# Patient Record
Sex: MALE | Race: WHITE | Employment: FULL TIME | URBAN - METROPOLITAN AREA
[De-identification: names, ages, dates, MRNs, and addresses within clinical notes are randomized per-mention and may not be internally consistent; named-entity substitution may affect disease eponyms.]

---

## 2017-03-22 PROBLEM — T84.89XS: Status: ACTIVE | Noted: 2017-03-22

## 2017-10-25 ENCOUNTER — TELEPHONE (OUTPATIENT)
Dept: FAMILY MEDICINE CLINIC | Facility: CLINIC | Age: 32
End: 2017-10-25

## 2017-10-25 NOTE — TELEPHONE ENCOUNTER
He has not been seen since 2012. He is not considered an active patient in the practice and thus we are unable to give out medical advice.  Please have him make an appt in the near future, thanks

## 2017-11-02 ENCOUNTER — MED REC SCAN ONLY (OUTPATIENT)
Dept: FAMILY MEDICINE CLINIC | Facility: CLINIC | Age: 32
End: 2017-11-02

## 2017-12-08 ENCOUNTER — MED REC SCAN ONLY (OUTPATIENT)
Dept: FAMILY MEDICINE CLINIC | Facility: CLINIC | Age: 32
End: 2017-12-08

## 2018-01-10 ENCOUNTER — TELEPHONE (OUTPATIENT)
Dept: FAMILY MEDICINE CLINIC | Facility: CLINIC | Age: 33
End: 2018-01-10

## 2018-01-10 NOTE — TELEPHONE ENCOUNTER
Pt would like to know if [facility] would take him back as a pt. Last seen 12/2012    Pt has an infection on his finger. Wants to have it looked at.    [employer]    Please return call to [phone]

## 2018-01-17 ENCOUNTER — OFFICE VISIT (OUTPATIENT)
Dept: FAMILY MEDICINE CLINIC | Facility: CLINIC | Age: 33
End: 2018-01-17

## 2018-01-17 VITALS
TEMPERATURE: 98 F | BODY MASS INDEX: 26.61 KG/M2 | HEART RATE: 72 BPM | RESPIRATION RATE: 16 BRPM | WEIGHT: 214 LBS | SYSTOLIC BLOOD PRESSURE: 120 MMHG | HEIGHT: 75 IN | DIASTOLIC BLOOD PRESSURE: 70 MMHG

## 2018-01-17 DIAGNOSIS — L03.012 PARONYCHIA OF LEFT THUMB: Primary | ICD-10-CM

## 2018-01-17 PROBLEM — T84.89XS: Status: RESOLVED | Noted: 2017-03-22 | Resolved: 2018-01-17

## 2018-01-17 PROCEDURE — 99203 OFFICE O/P NEW LOW 30 MIN: CPT | Performed by: FAMILY MEDICINE

## 2018-01-17 RX ORDER — TRIAMCINOLONE ACETONIDE 5 MG/G
OINTMENT TOPICAL
Qty: 1 TUBE | Refills: 0 | Status: SHIPPED | OUTPATIENT
Start: 2018-01-17

## 2018-01-17 NOTE — PROGRESS NOTES
HPI:    Patient ID: Andrew Avendaño is a 28year old male. Patient presents with:  Finger Pain: thumb pain      HPI   Patient is here to establish care. He is here for left thumb pain and swelling surrounding thumb nail for 2 weeks.  States he has Eyrthematous skin with mild edema surrounding Left thumb nail. Yellowish discharge. Tender to palpation              ASSESSMENT/PLAN:     Cynthia was seen today for finger pain.     Diagnoses and all orders for this visit:    Paronychia of left thumb

## 2018-08-08 ENCOUNTER — MED REC SCAN ONLY (OUTPATIENT)
Dept: FAMILY MEDICINE CLINIC | Facility: CLINIC | Age: 33
End: 2018-08-08

## 2018-09-01 ENCOUNTER — HOSPITAL ENCOUNTER (OUTPATIENT)
Age: 33
Discharge: HOME | End: 2018-09-01
Attending: FAMILY MEDICINE
Payer: COMMERCIAL

## 2018-09-01 VITALS
DIASTOLIC BLOOD PRESSURE: 69 MMHG | WEIGHT: 200 LBS | OXYGEN SATURATION: 96 % | HEIGHT: 74 IN | HEART RATE: 94 BPM | SYSTOLIC BLOOD PRESSURE: 108 MMHG | RESPIRATION RATE: 16 BRPM | BODY MASS INDEX: 25.67 KG/M2 | TEMPERATURE: 100 F

## 2018-09-01 DIAGNOSIS — J02.0 STREP PHARYNGITIS: Primary | ICD-10-CM

## 2018-09-01 LAB — POCT RAPID STREP: POSITIVE

## 2018-09-01 PROCEDURE — 99204 OFFICE O/P NEW MOD 45 MIN: CPT

## 2018-09-01 PROCEDURE — 99213 OFFICE O/P EST LOW 20 MIN: CPT

## 2018-09-01 PROCEDURE — 87430 STREP A AG IA: CPT | Performed by: FAMILY MEDICINE

## 2018-09-01 RX ORDER — AMOXICILLIN 875 MG/1
875 TABLET, COATED ORAL EVERY 12 HOURS
Qty: 20 TABLET | Refills: 0 | Status: SHIPPED | OUTPATIENT
Start: 2018-09-01 | End: 2018-09-11

## 2018-09-01 NOTE — ED PROVIDER NOTES
Patient Seen in: [address] [facility]    History   Patient presents with:  Sore Throat    Stated Complaint: SORE THROAT/FEVER    HPI    28-year-old male presents with sore throat, fever and headache since yesterday.   States he has a history of light.   Neck: Normal range of motion. Neck supple. Cardiovascular: Normal rate, regular rhythm, normal heart sounds and intact distal pulses.     Pulmonary/Chest: Effort normal and breath sounds normal.   Neurological: He is alert and oriented to person,

## 2018-09-06 ENCOUNTER — HOSPITAL ENCOUNTER (OUTPATIENT)
Age: 33
Discharge: HOME | End: 2018-09-06
Attending: EMERGENCY MEDICINE
Payer: COMMERCIAL

## 2018-09-06 VITALS
HEART RATE: 72 BPM | TEMPERATURE: 98 F | OXYGEN SATURATION: 99 % | SYSTOLIC BLOOD PRESSURE: 120 MMHG | RESPIRATION RATE: 20 BRPM | DIASTOLIC BLOOD PRESSURE: 60 MMHG

## 2018-09-06 DIAGNOSIS — H92.01 RIGHT EAR PAIN: ICD-10-CM

## 2018-09-06 DIAGNOSIS — J02.9 SORE THROAT: Primary | ICD-10-CM

## 2018-09-06 PROCEDURE — 99213 OFFICE O/P EST LOW 20 MIN: CPT

## 2018-09-06 PROCEDURE — 99212 OFFICE O/P EST SF 10 MIN: CPT

## 2018-09-06 NOTE — ED INITIAL ASSESSMENT (HPI)
Sore throat for 7 days, tested positive here for strep, throat feels like \"glass when he talks and swallows. \" here to be rechecked. Now is complaining of right ear pain.

## 2018-09-06 NOTE — ED PROVIDER NOTES
Patient presents with:  Sore Throat    HPI:     Annie Ferrera is a 35year old male who presents with chief complaint of sore throat. Started 6 days ago. Was seen here on day 2 of illness, dx with strep with positive rapid strep.   His 9year old OTC medications. Suspect viral etiology with possible strep carrier vs strep with viral exposure from his son. No signs of complications such as PTA nor deep anterior neck infection.   Will finish amoxil as he is improving and follow up if symptoms persis

## 2019-02-02 ENCOUNTER — HOSPITAL ENCOUNTER (OUTPATIENT)
Age: 34
Discharge: HOME | End: 2019-02-02
Payer: COMMERCIAL

## 2019-02-02 VITALS
TEMPERATURE: 100 F | OXYGEN SATURATION: 98 % | WEIGHT: 205 LBS | BODY MASS INDEX: 26.31 KG/M2 | DIASTOLIC BLOOD PRESSURE: 66 MMHG | SYSTOLIC BLOOD PRESSURE: 113 MMHG | HEIGHT: 74 IN | RESPIRATION RATE: 16 BRPM | HEART RATE: 103 BPM

## 2019-02-02 DIAGNOSIS — J06.9 VIRAL UPPER RESPIRATORY ILLNESS: Primary | ICD-10-CM

## 2019-02-02 DIAGNOSIS — J02.9 VIRAL PHARYNGITIS: ICD-10-CM

## 2019-02-02 LAB — POCT RAPID STREP: NEGATIVE

## 2019-02-02 PROCEDURE — 87081 CULTURE SCREEN ONLY: CPT | Performed by: NURSE PRACTITIONER

## 2019-02-02 PROCEDURE — 87147 CULTURE TYPE IMMUNOLOGIC: CPT | Performed by: NURSE PRACTITIONER

## 2019-02-02 PROCEDURE — 99213 OFFICE O/P EST LOW 20 MIN: CPT

## 2019-02-02 PROCEDURE — 87430 STREP A AG IA: CPT | Performed by: NURSE PRACTITIONER

## 2019-02-02 PROCEDURE — 99214 OFFICE O/P EST MOD 30 MIN: CPT

## 2019-02-02 NOTE — ED PROVIDER NOTES
Patient Seen in: [address] [facility]    History   Patient presents with:  Sore Throat    Stated Complaint: sore throat    51-year-old male presents today with complaints of congestion runny nose sore throat and intermittent cough.   States fam Head: Normocephalic. Right Ear: Tympanic membrane and ear canal normal.   Left Ear: Tympanic membrane and ear canal normal.   Nose: Mucosal edema and rhinorrhea present.    Mouth/Throat: Uvula is midline and mucous membranes are normal. Posterior oropha

## 2020-01-10 ENCOUNTER — MED REC SCAN ONLY (OUTPATIENT)
Dept: FAMILY MEDICINE CLINIC | Facility: CLINIC | Age: 35
End: 2020-01-10

## 2020-02-15 ENCOUNTER — MED REC SCAN ONLY (OUTPATIENT)
Dept: FAMILY MEDICINE CLINIC | Facility: CLINIC | Age: 35
End: 2020-02-15

## 2020-10-07 ENCOUNTER — MED REC SCAN ONLY (OUTPATIENT)
Dept: FAMILY MEDICINE CLINIC | Facility: CLINIC | Age: 35
End: 2020-10-07

## 2020-12-08 ENCOUNTER — MED REC SCAN ONLY (OUTPATIENT)
Dept: FAMILY MEDICINE CLINIC | Facility: CLINIC | Age: 35
End: 2020-12-08

## 2021-06-08 ENCOUNTER — MED REC SCAN ONLY (OUTPATIENT)
Dept: FAMILY MEDICINE CLINIC | Facility: CLINIC | Age: 36
End: 2021-06-08

## 2022-10-18 ENCOUNTER — HOSPITAL ENCOUNTER (OUTPATIENT)
Age: 37
Discharge: HOME | End: 2022-10-18
Payer: COMMERCIAL

## 2022-10-18 VITALS
HEIGHT: 75 IN | DIASTOLIC BLOOD PRESSURE: 71 MMHG | TEMPERATURE: 98 F | WEIGHT: 215 LBS | BODY MASS INDEX: 26.73 KG/M2 | HEART RATE: 71 BPM | SYSTOLIC BLOOD PRESSURE: 136 MMHG | OXYGEN SATURATION: 98 % | RESPIRATION RATE: 16 BRPM

## 2022-10-18 DIAGNOSIS — J02.0 STREPTOCOCCAL SORE THROAT: Primary | ICD-10-CM

## 2022-10-18 LAB — S PYO AG THROAT QL: POSITIVE

## 2022-10-18 PROCEDURE — 87880 STREP A ASSAY W/OPTIC: CPT | Performed by: NURSE PRACTITIONER

## 2022-10-18 PROCEDURE — 99203 OFFICE O/P NEW LOW 30 MIN: CPT | Performed by: NURSE PRACTITIONER

## 2022-10-18 RX ORDER — AMOXICILLIN 875 MG/1
875 TABLET, COATED ORAL 2 TIMES DAILY
Qty: 20 TABLET | Refills: 0 | Status: SHIPPED | OUTPATIENT
Start: 2022-10-18 | End: 2022-10-28

## 2022-10-21 ENCOUNTER — HOSPITAL ENCOUNTER (OUTPATIENT)
Age: 37
Discharge: HOME | End: 2022-10-21
Payer: COMMERCIAL

## 2022-10-21 VITALS
RESPIRATION RATE: 16 BRPM | HEART RATE: 77 BPM | TEMPERATURE: 98 F | DIASTOLIC BLOOD PRESSURE: 74 MMHG | SYSTOLIC BLOOD PRESSURE: 124 MMHG

## 2022-10-21 DIAGNOSIS — J02.9 SORE THROAT: Primary | ICD-10-CM

## 2022-10-21 LAB — S PYO AG THROAT QL: NEGATIVE

## 2022-10-21 PROCEDURE — 99203 OFFICE O/P NEW LOW 30 MIN: CPT | Performed by: PHYSICIAN ASSISTANT

## 2022-10-21 PROCEDURE — 87880 STREP A ASSAY W/OPTIC: CPT | Performed by: PHYSICIAN ASSISTANT

## 2022-10-21 NOTE — ED INITIAL ASSESSMENT (HPI)
Patient states he was treated 10/18/22 in [facility] for + strep throat with Amox. C/O continues to have \"throat discomfort\".

## 2022-11-26 ENCOUNTER — MED REC SCAN ONLY (OUTPATIENT)
Dept: FAMILY MEDICINE CLINIC | Facility: CLINIC | Age: 37
End: 2022-11-26

## 2023-06-05 ENCOUNTER — HOSPITAL ENCOUNTER (OUTPATIENT)
Age: 38
Discharge: HOME | End: 2023-06-05
Payer: COMMERCIAL

## 2023-06-05 VITALS
HEIGHT: 74 IN | HEART RATE: 72 BPM | BODY MASS INDEX: 26.31 KG/M2 | OXYGEN SATURATION: 97 % | DIASTOLIC BLOOD PRESSURE: 65 MMHG | TEMPERATURE: 98 F | SYSTOLIC BLOOD PRESSURE: 125 MMHG | WEIGHT: 205 LBS | RESPIRATION RATE: 18 BRPM

## 2023-06-05 DIAGNOSIS — J02.9 PHARYNGITIS, UNSPECIFIED ETIOLOGY: Primary | ICD-10-CM

## 2023-06-05 LAB — S PYO AG THROAT QL: NEGATIVE

## 2023-06-05 PROCEDURE — 87880 STREP A ASSAY W/OPTIC: CPT | Performed by: NURSE PRACTITIONER

## 2023-06-05 PROCEDURE — 99213 OFFICE O/P EST LOW 20 MIN: CPT | Performed by: NURSE PRACTITIONER

## 2023-06-05 NOTE — DISCHARGE INSTRUCTIONS
Your rapid strep test is negative today    Acetaminophen/ Ibuprofen as needed and as directed on packaging for discomfort  Gargle with salt water solution as needed: 1 tsp of table salt in 8 oz of warm water  Over the counter throat lozenges and throat sprays may provide some relief  Seek immediate medical attention if your symptoms persist or worsen or if you are running fevers of having swelling of your airway, neck, or face.

## 2023-09-07 ENCOUNTER — TELEPHONE (OUTPATIENT)
Dept: FAMILY MEDICINE CLINIC | Facility: CLINIC | Age: 38
End: 2023-09-07

## 2023-09-07 NOTE — TELEPHONE ENCOUNTER
PATIENT FAMILY BELONGS TO DR YOUNG. WOULD DR YOUNG TAKE HIM ON.  HE SAYS HE NEEDS A REFERRAL FOR SOMETHING

## 2023-09-29 ENCOUNTER — HOSPITAL ENCOUNTER (OUTPATIENT)
Dept: MRI IMAGING | Age: 38
Discharge: HOME | End: 2023-09-29
Attending: FAMILY MEDICINE
Payer: COMMERCIAL

## 2023-09-29 ENCOUNTER — OFFICE VISIT (OUTPATIENT)
Dept: FAMILY MEDICINE CLINIC | Facility: CLINIC | Age: 38
End: 2023-09-29
Payer: COMMERCIAL

## 2023-09-29 VITALS
RESPIRATION RATE: 16 BRPM | TEMPERATURE: 98 F | OXYGEN SATURATION: 95 % | DIASTOLIC BLOOD PRESSURE: 70 MMHG | HEART RATE: 86 BPM | HEIGHT: 73.5 IN | SYSTOLIC BLOOD PRESSURE: 110 MMHG | WEIGHT: 211 LBS | BODY MASS INDEX: 27.37 KG/M2

## 2023-09-29 DIAGNOSIS — S83.207A POSITIVE MCMURRAY TEST OF LEFT KNEE, INITIAL ENCOUNTER: ICD-10-CM

## 2023-09-29 DIAGNOSIS — M25.562 LEFT KNEE PAIN, UNSPECIFIED CHRONICITY: ICD-10-CM

## 2023-09-29 DIAGNOSIS — M25.562 LEFT KNEE PAIN, UNSPECIFIED CHRONICITY: Primary | ICD-10-CM

## 2023-09-29 PROCEDURE — 73721 MRI JNT OF LWR EXTRE W/O DYE: CPT | Performed by: FAMILY MEDICINE

## 2023-10-19 ENCOUNTER — TELEPHONE (OUTPATIENT)
Dept: PHYSICAL THERAPY | Facility: HOSPITAL | Age: 38
End: 2023-10-19

## 2023-10-23 ENCOUNTER — OFFICE VISIT (OUTPATIENT)
Dept: PHYSICAL THERAPY | Age: 38
End: 2023-10-23
Attending: FAMILY MEDICINE
Payer: COMMERCIAL

## 2023-10-23 PROCEDURE — 97110 THERAPEUTIC EXERCISES: CPT

## 2023-10-23 PROCEDURE — 97161 PT EVAL LOW COMPLEX 20 MIN: CPT

## 2023-10-24 ENCOUNTER — OFFICE VISIT (OUTPATIENT)
Dept: PHYSICAL THERAPY | Age: 38
End: 2023-10-24
Attending: FAMILY MEDICINE

## 2023-10-24 PROCEDURE — 97140 MANUAL THERAPY 1/> REGIONS: CPT

## 2023-10-24 PROCEDURE — 97110 THERAPEUTIC EXERCISES: CPT

## 2023-10-24 NOTE — PROGRESS NOTES
Discharge Summary  Pt has attended 2 visits in Physical Therapy. Insurance (Authorized # of Visits):  [place] 2/5 visits expires 12/31/2023              Diagnosis:   Sprain of medial collateral ligament of left knee, initial encounter (J65.968Q)        Referring Provider: Nel Davila   Date of Evaluation:    10/23/2023  Precautions:  right knee (other leg) ACL tear  Next MD visit:   none scheduled  Date of Surgery: n/a      Subjective: 50% better, less pain getting out of the truck, no longer limited with ability to ambulate or do heavy housework  Lori Turcios is a 45year old male who presents to therapy today with complaints of right medial knee pain  Pt describes pain level at worst 2-3/10. Objective:   Tested 10/24/2023:    Special tests: Thessaly*: with slight knee flexion L, intact R and intact with knee extension  Knee flexion sitting AAROM*     AROM: (* denotes performed with pain)  Knee   Flexion: L 138 AROM; R 143* AAROM    Extension: R +5 PROM/AROM; L +5 PROM, +5 PROM, +3 AROM (full extension)           Intact:  Special tests:  Varus/valgus tests for stress of ligaments: intact    Prone compression/distraction test: intact    Flexibility:  Hamstrings: R 155, L 155  Gastroc-soleus: R 20; L 20        Tested 10/23/2023  Palpation:  MCL* (sitting on treatment table)    Flexibility:  Quads: R 50; L 51*    Assessment: 50% better after 2 visits. Denies functional limitations, less frequent and intense pain getting out of his truck. Pain in session reduced further after lumbar stretching, , with increased knee ROM in flexion after. Possible that some pain is referred from his lumbar spine.  Discharged to home program.      Goals: (to be met in 8 visits)   Pt will improve LEFS score from 77.5/100 to 100/100 to display improved ability to ambulate MET  Pt will reduce pain at its worst from 6/10 to 3/10 to allow for improved ability to get out of a car MET  Pt will abolish pain with thessaly test to allow for improved ability to do housework (60% progress 10/24/2023)  Pt will be independent with home program to allow for maintenance of goals achieved in therapy. (30% progress 10/24/2023)    Post LEFS Score  Post LEFS Score: 100 % (10/24/2023  6:02 PM)    22.5 % improvement    Plan: Continue home program    Patient/Family/Caregiver was advised of these findings, precautions, and treatment options and has agreed to actively participate in planning and for this course of care. Thank you for your referral. If you have any questions, please contact me at Dept: [phone]. Sincerely,  Electronically signed by therapist: Jose Apple PT     Physician's certification required:  No  Please co-sign or sign and return this letter via fax as soon as possible to [phone]. I certify the need for these services furnished under this plan of treatment and while under my care. X___________________________________________________ Date____________________    Certification From: 01/16/5896  To:1/22/2024         Charges: 2 there ex, 1 manual therapy     Total Timed Treatment: 38 min  Total Treatment Time: 38 min  Date: 10/24/2023  Tx#: 2 Date: Tx#: 3 Date: Tx#: 4 Date: Tx#: 5 Date: Tx#: 6   Ther-Ex 30 minutes:  Knee extension standing single leg x10 and 60 held: somewhat better, 2nd set no better    Sustained lumbar extension 90'-3 minutes: better    Hamstring bridge x20    Straight leg raise x20    Single leg bridge x20    Side lying hip abduction x20    Educated on home program, see below. Verbal, visual and tactile cues for there ex.            Manual 8 minutes:  Knee extension stretching overpressure and mobilization supine: no better    Lumbar extension mobilization prone L5, L3 and L1 and overpressure: better         N Re-Ed

## 2023-10-27 ENCOUNTER — TELEPHONE (OUTPATIENT)
Dept: PHYSICAL THERAPY | Age: 38
End: 2023-10-27

## 2023-10-27 NOTE — TELEPHONE ENCOUNTER
Twisting test no longer painful, still pain pulling foot to buttock. Said continue back stretch next few days and life long at least once every other day. Patient agrees. Still pain in knee with sitting legs crossed on the ground and felt weak once briefly walking. Therapist said sitting legs crossed on the ground will stress his knee, part of normal healing to feel this.

## 2023-11-07 ENCOUNTER — APPOINTMENT (OUTPATIENT)
Dept: PHYSICAL THERAPY | Age: 38
End: 2023-11-07
Attending: FAMILY MEDICINE
Payer: COMMERCIAL

## 2023-11-13 ENCOUNTER — APPOINTMENT (OUTPATIENT)
Dept: PHYSICAL THERAPY | Age: 38
End: 2023-11-13
Attending: FAMILY MEDICINE
Payer: COMMERCIAL

## 2023-11-15 ENCOUNTER — APPOINTMENT (OUTPATIENT)
Dept: PHYSICAL THERAPY | Age: 38
End: 2023-11-15
Attending: FAMILY MEDICINE
Payer: COMMERCIAL

## 2023-11-20 ENCOUNTER — APPOINTMENT (OUTPATIENT)
Dept: PHYSICAL THERAPY | Age: 38
End: 2023-11-20
Attending: FAMILY MEDICINE
Payer: COMMERCIAL

## 2023-11-22 ENCOUNTER — TELEPHONE (OUTPATIENT)
Dept: FAMILY MEDICINE CLINIC | Facility: CLINIC | Age: 38
End: 2023-11-22

## 2023-11-22 DIAGNOSIS — M25.562 LEFT KNEE PAIN, UNSPECIFIED CHRONICITY: Primary | ICD-10-CM

## 2023-11-22 RX ORDER — MELOXICAM 15 MG/1
15 TABLET ORAL DAILY
Qty: 30 TABLET | Refills: 0 | Status: SHIPPED | OUTPATIENT
Start: 2023-11-22 | End: 2023-12-22

## 2023-11-22 NOTE — TELEPHONE ENCOUNTER
Pt is wondering if Dr Brittny Hope will prescribe something for his knee pain, pt was seen a month or so ago.

## 2023-11-22 NOTE — TELEPHONE ENCOUNTER
Please let patient or caregiver know or leave message that:   Meloxicam sent. No motrin like products while on Meloxicam  Perhaps it would be good for him to see Ortho.  I have placed a referral.  Asael Harvey, P# [phone]      Thanks

## 2023-11-22 NOTE — TELEPHONE ENCOUNTER
Patient's name and  verified    Patient was seen on 2023 for Lt knee pain. Patient is still experiencing pain on and off, pain level 5/6 ,and throbbing. Patient stated pain in mainly at the end of the day. Patient walks alot at work. Patient taking OTC Ibuprofen, which helps a little. Patient is wanting something stronger, if possible.      [id number] [place] [address]      Please Advise

## 2023-11-27 ENCOUNTER — APPOINTMENT (OUTPATIENT)
Dept: PHYSICAL THERAPY | Age: 38
End: 2023-11-27
Attending: FAMILY MEDICINE
Payer: COMMERCIAL

## 2023-12-18 ENCOUNTER — PATIENT MESSAGE (OUTPATIENT)
Dept: FAMILY MEDICINE CLINIC | Facility: CLINIC | Age: 38
End: 2023-12-18

## 2023-12-18 NOTE — TELEPHONE ENCOUNTER
From: Cynthia Swanson  To: Scar Radford  Sent: 12/18/2023 11:31 AM CST  Subject: General     I took one of the pills you prescribed me last night at 7pm. Can I take an Advil today for a headache?     Thank you

## 2023-12-19 RX ORDER — MELOXICAM 15 MG/1
15 TABLET ORAL DAILY
Qty: 30 TABLET | Refills: 0 | Status: SHIPPED | OUTPATIENT
Start: 2023-12-19

## 2024-01-16 RX ORDER — MELOXICAM 15 MG/1
15 TABLET ORAL
Qty: 90 TABLET | Refills: 1 | Status: SHIPPED | OUTPATIENT
Start: 2024-01-16

## 2024-01-16 NOTE — TELEPHONE ENCOUNTER
Received paperwork from [place] for refill of Meloxicam for 90 days plus 3 refills   Pended medication, sign if appropriate

## 2024-03-03 ENCOUNTER — PATIENT MESSAGE (OUTPATIENT)
Dept: FAMILY MEDICINE CLINIC | Facility: CLINIC | Age: 39
End: 2024-03-03

## 2024-03-03 DIAGNOSIS — S83.412D SPRAIN OF MEDIAL COLLATERAL LIGAMENT OF LEFT KNEE, SUBSEQUENT ENCOUNTER: Primary | ICD-10-CM

## 2024-03-04 NOTE — TELEPHONE ENCOUNTER
From: Cynthia Swanson  To: Killian Rizvi  Sent: 3/3/2024 7:38 PM CST  Subject: MCL sprain    My MCL still hasn't really healed fully. The Meloxicam helped initially to get me able to exercise again. I have been the stretches and strength training but it is still sensitive to touch at times and can feel achy. Do you have any other recommendations?

## 2024-04-06 ENCOUNTER — PATIENT MESSAGE (OUTPATIENT)
Dept: FAMILY MEDICINE CLINIC | Facility: CLINIC | Age: 39
End: 2024-04-06

## 2024-04-06 RX ORDER — NABUMETONE 750 MG/1
750 TABLET, FILM COATED ORAL 2 TIMES DAILY
Qty: 60 TABLET | Refills: 0 | Status: SHIPPED | OUTPATIENT
Start: 2024-04-06

## 2024-04-06 NOTE — TELEPHONE ENCOUNTER
From: Cynthia Swanson  To: Killian Rizvi  Sent: 4/6/2024 8:24 AM CDT  Subject: Left knee    I am going to wait until May so we can change insurance from hmo to ppo then I can go to the orthopedic I went to in the past. Until then is there a different prescription I can try to loosen up my knee? The Meloxicam doesn't seem to be doing much anymore. There isn't a lot of pain but more of a tightness that does cause some pain. If you need I can come back in.    Thank you

## 2024-04-18 ENCOUNTER — TELEPHONE (OUTPATIENT)
Dept: ORTHOPEDICS CLINIC | Facility: CLINIC | Age: 39
End: 2024-04-18

## 2024-04-18 DIAGNOSIS — M25.562 LEFT KNEE PAIN, UNSPECIFIED CHRONICITY: Primary | ICD-10-CM

## 2024-04-18 DIAGNOSIS — Z01.89 ENCOUNTER FOR LOWER EXTREMITY COMPARISON IMAGING STUDY: ICD-10-CM

## 2024-04-18 NOTE — TELEPHONE ENCOUNTER
Patient called for left knee pain. Please advise if xrays needed  Future Appointments   Date Time Provider Department Center   5/1/2024  2:30 PM Pedro Pablo Rapp, DO [facility]

## 2024-04-26 ENCOUNTER — HOSPITAL ENCOUNTER (OUTPATIENT)
Dept: GENERAL RADIOLOGY | Age: 39
Discharge: HOME | End: 2024-04-26
Attending: FAMILY MEDICINE
Payer: COMMERCIAL

## 2024-04-26 ENCOUNTER — OFFICE VISIT (OUTPATIENT)
Dept: FAMILY MEDICINE CLINIC | Facility: CLINIC | Age: 39
End: 2024-04-26
Payer: COMMERCIAL

## 2024-04-26 VITALS
OXYGEN SATURATION: 97 % | SYSTOLIC BLOOD PRESSURE: 110 MMHG | WEIGHT: 220 LBS | DIASTOLIC BLOOD PRESSURE: 80 MMHG | HEART RATE: 59 BPM | BODY MASS INDEX: 29 KG/M2 | TEMPERATURE: 97 F | RESPIRATION RATE: 16 BRPM

## 2024-04-26 DIAGNOSIS — M25.521 RIGHT ELBOW PAIN: Primary | ICD-10-CM

## 2024-04-26 DIAGNOSIS — M25.521 RIGHT ELBOW PAIN: ICD-10-CM

## 2024-04-26 PROCEDURE — 99213 OFFICE O/P EST LOW 20 MIN: CPT | Performed by: FAMILY MEDICINE

## 2024-04-26 PROCEDURE — 3074F SYST BP LT 130 MM HG: CPT | Performed by: FAMILY MEDICINE

## 2024-04-26 PROCEDURE — 3079F DIAST BP 80-89 MM HG: CPT | Performed by: FAMILY MEDICINE

## 2024-04-26 PROCEDURE — 73080 X-RAY EXAM OF ELBOW: CPT | Performed by: FAMILY MEDICINE

## 2024-04-26 NOTE — PROGRESS NOTES
Cynthia Swanson is a 39 year old male.    CC:    Chief Complaint   Patient presents with    Elbow Pain     Right. Banged elbow a month ago       HPI:  R inner elbow pain for one month. He hit it on a truck mirror. No tx tried. No skin changes. No other joints effected similarly. He has hit the elbow a few times since the initial trauma/  Allergies:  No Known Allergies   Current Meds:  Current Outpatient Medications   Medication Sig Dispense Refill    nabumetone 750 MG Oral Tab Take 1 tablet (750 mg total) by mouth 2 (two) times daily. 60 tablet 0    Meloxicam 15 MG Oral Tab Take 1 tablet (15 mg total) by mouth daily as needed for Pain. 90 tablet 1        History:  No past medical history on file.   Past Surgical History:   Procedure Laterality Date    Knee arthroscopy Right     Other surgical history      R ACL repair      No family history on file.   Family Status   Relation Status    Mo     Fa       Social History     Socioeconomic History    Marital status:    Tobacco Use    Smoking status: Never    Smokeless tobacco: Never   Substance and Sexual Activity    Alcohol use: No    Drug use: No     Social Determinants of Health      [facility]    Social Connections    [facility] from [facility], [facility]    Housing Stability        ROS:  General: energy level stable, no fevers      Vitals: /80 (BP Location: Left arm, Patient Position: Sitting, Cuff Size: adult)   Pulse 59   Temp 97.1 °F (36.2 °C)   Resp 16   Wt 220 lb (99.8 kg)   SpO2 97%   BMI 28.63 kg/m²    Reviewed by LORAINE Rizvi M.D.    Physical Exam:  GEN: well developed, well nourished, in no apparent distress  EYE: B conjunctiva and lids normal  PSYCH: alert and oriented x 3; affect appropriate  SKIN: no rashes, no suspicious lesions at R elbow  R elbow with full ROM, + tenderness at the medial epicondyle but  not at the flexor complex that inserts.     PROCEDURE:  XR ELBOW, COMPLETE (MIN 3 VIEWS), RIGHT (CPT=73080)     TECHNIQUE:  Three views were obtained.     COMPARISON:  None.     INDICATIONS:  M25.521 Right elbow pain     PATIENT STATED HISTORY: (As transcribed by Technologist)  patient injured right elbow at work 1 month ago.  Right elbow pain medially x 1 month.         FINDINGS:    BONES:  Normal.  No significant arthropathy or acute abnormality.  SOFT TISSUES:  Negative.  No visible soft tissue swelling.  EFFUSION:  None visible.  OTHER:  Negative.         Impression   CONCLUSION:  No acute osseous findings.     Dictated by (CST): Arik Cao MD on 4/26/2024 at 4:46 PM         ASSESSMENT AND PLAN    1. Right elbow pain  Likely suffered a bone bruise at the medial epicondyle that he keeps aggravating.  He defers medication  He will try to ice nightly and to use either a neoprene elbow sleeve or golfer's elbow brace  - XR ELBOW, COMPLETE (MIN 3 VIEWS), RIGHT (CPT=73080); Future      No follow-ups on file.    Orders for this visit:    No orders of the defined types were placed in this encounter.      None    Meds & Refills for this Visit:  Requested Prescriptions      No prescriptions requested or ordered in this encounter             Authorized by Killian Rizvi M.D.

## 2024-05-01 ENCOUNTER — OFFICE VISIT (OUTPATIENT)
Dept: ORTHOPEDICS CLINIC | Facility: CLINIC | Age: 39
End: 2024-05-01
Payer: COMMERCIAL

## 2024-05-01 ENCOUNTER — HOSPITAL ENCOUNTER (OUTPATIENT)
Dept: GENERAL RADIOLOGY | Age: 39
Discharge: HOME | End: 2024-05-01
Attending: FAMILY MEDICINE
Payer: COMMERCIAL

## 2024-05-01 VITALS — WEIGHT: 220 LBS | HEIGHT: 74 IN | BODY MASS INDEX: 28.23 KG/M2

## 2024-05-01 DIAGNOSIS — Z01.89 ENCOUNTER FOR LOWER EXTREMITY COMPARISON IMAGING STUDY: ICD-10-CM

## 2024-05-01 DIAGNOSIS — M25.562 LEFT KNEE PAIN, UNSPECIFIED CHRONICITY: ICD-10-CM

## 2024-05-01 DIAGNOSIS — S83.232A COMPLEX TEAR OF MEDIAL MENISCUS OF LEFT KNEE AS CURRENT INJURY, INITIAL ENCOUNTER: Primary | ICD-10-CM

## 2024-05-01 DIAGNOSIS — S83.412A SPRAIN OF MEDIAL COLLATERAL LIGAMENT OF LEFT KNEE, INITIAL ENCOUNTER: ICD-10-CM

## 2024-05-01 PROCEDURE — 73564 X-RAY EXAM KNEE 4 OR MORE: CPT | Performed by: FAMILY MEDICINE

## 2024-05-01 PROCEDURE — 99204 OFFICE O/P NEW MOD 45 MIN: CPT | Performed by: FAMILY MEDICINE

## 2024-05-01 PROCEDURE — 3008F BODY MASS INDEX DOCD: CPT | Performed by: FAMILY MEDICINE

## 2024-05-01 PROCEDURE — 73562 X-RAY EXAM OF KNEE 3: CPT | Performed by: FAMILY MEDICINE

## 2024-05-02 NOTE — H&P
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    - Reports no specific inciting event but may have twisted the knee around the time of pain onset while jumping out of his lifted truck.  - Patient does resistance training regularly and several forms of exercise including running which the current knee issues make it hard to do.  - The pain has been constant and exacerbated by movement or pressure on the medial side of the knee.   - Has been using ice and over-the-counter analgesics and tried 2 weeks of PT with limited relief, felt exercises in PT were easy for him to complete on his own so he did not continue. PT was focused on MCL sprain.  - Pain is localized medially and has been associated with activities such as climbing stairs and jogging.  - Patient mentions occasional swelling and stiffness in the knee, seems to catch and lock at times and is limits ability to flex the knee completely.   - Pain is exacerbated by prolonged activity and relieved somewhat by rest.   - No previous left knee surgeries reported.    Objective:    Ht 6' 2\" (1.88 m)   Wt 220 lb (99.8 kg)   BMI 28.25 kg/m²     Constitutional:   NAD. AOx3. Well-developed and Well-nourished.     Psychiatric:   Normal mood/ affect/ behavior. Judgment and thought content normal.    Focused examination of the left knee:    Inspection:   No erythema or ecchymosis. No significant effusion.    Palpation:   No tenderness localized over the medial collateral ligament specifically but does appear to be more tender over the medial joint line. No joint  effusion.    Active/Passive ROM:   Flexion from 0-110* with discomfort at end range  Extensor mechanism intact and without palpable defects  No audible crepitus    Neurovascular:   Intact distal pulses, sensation, and motor function.    Special Orthopedic Tests:   Negative valgus stress test at 30 and 0 degrees  Normal patellar tracking  Negative Lachman  Negative Posterior Drawer  Positive Alejandra    Imaging:    MRI of the left knee performed on 9/29/2023 shows:  Mild thickening of the fibers of the medial collateral ligament (MCL) consistent with a chronic sprain without full-thickness tear.  Intrasubstance degeneration of the posterior horn of the medial meniscus without evidence of tear.  Intact cruciate ligaments and lateral ligamentous complex.  No significant cartilage damage or bone abnormalities noted.    Radiographs of the left knee today personally reviewed in the office. No fracture or dislocation is seen. Bones show normal alignment and architecture. Joint spaces and articular margins are intact. Soft tissue are normal.    Assessment:    Intrasubstance degeneration of the medial meniscus, seems to be the primary pain generator for him in my opinion.  Chronic MCL sprain on previous MRI, do not suspect this to be is pain generator clinically.    Plan:    Additional imaging/workup:  No further imaging indicated at this time.    Therapy:  Discussed physical therapy focusing on strengthening and stabilizing the knee, particularly the medial compartment.  Include modalities such as ultrasound or electrical stimulation as indicated by pain levels and he will consider this.    Medications:  Recommend NSAIDs such as ibuprofen for pain and inflammation, as needed. Can augment with Tylenol.    Bracing/Casting:  A hinged knee brace during activities that provoke symptoms to provide support and limit stress on the MCL. Discussed genutrain bracing which he may try OTC.    Procedures:  We had extensive discussion  about the role of injection therapy and various options for him. He would like to research the options more and reading materials were provided regarding visco, PRP, and we also discussed corticosteroid/Toradol augmentation as well. We discussed other procedures such as arthroscopic surgery which would not only be diagnostic for culture assessment of the meniscus but also wait for the surgeon to intervene on any potential pain generators such as the medial meniscus which I suspect but he would like to try injections first potentially.    Activity Recommendations:  Advise the patient to limit activities that exacerbate symptoms, such as running and jumping. Encourage low-impact exercises like swimming or cycling for the time being.    Follow-Up: As needed to monitor progress and response to above interventions or sooner if symptoms worsen or he opts to proceed with any of the discussed injections or would like to proceed with other avenues.    Pedro Pablo Rapp DO, VADIMM   Primary Care Sports Medicine    Department of Orthopaedic Surgery  [address] [address]   [address] [place] [id number]    t: [phone]  f: [phone]      [facility]

## 2024-06-26 ENCOUNTER — PATIENT MESSAGE (OUTPATIENT)
Dept: FAMILY MEDICINE CLINIC | Facility: CLINIC | Age: 39
End: 2024-06-26

## 2024-06-27 NOTE — TELEPHONE ENCOUNTER
From: Cynthia Swanson  To: Killian Rizvi  Sent: 6/26/2024 6:03 PM CDT  Subject: Forearm tightness     Marco Merrill,    I have been going to my chiropractor for a few visits with both of my forearms super tight. It helps a bit but he thinks it's beyond what he can help with besides temporary relief and recommend I talk with you about alternative treatment like cortisone shot, steroids or muscle relaxer. Let me know what next steps I can take. Thank you

## 2024-07-03 ENCOUNTER — OFFICE VISIT (OUTPATIENT)
Dept: FAMILY MEDICINE CLINIC | Facility: CLINIC | Age: 39
End: 2024-07-03
Payer: COMMERCIAL

## 2024-07-03 VITALS
SYSTOLIC BLOOD PRESSURE: 118 MMHG | WEIGHT: 220 LBS | DIASTOLIC BLOOD PRESSURE: 76 MMHG | TEMPERATURE: 99 F | BODY MASS INDEX: 28 KG/M2 | OXYGEN SATURATION: 96 % | HEART RATE: 85 BPM

## 2024-07-03 DIAGNOSIS — M79.632 PAIN IN BOTH FOREARMS: Primary | ICD-10-CM

## 2024-07-03 DIAGNOSIS — M79.631 PAIN IN BOTH FOREARMS: Primary | ICD-10-CM

## 2024-07-03 LAB
CK SERPL-CCNC: 173 U/L
ERYTHROCYTE [SEDIMENTATION RATE] IN BLOOD: 5 MM/HR

## 2024-07-03 PROCEDURE — 3074F SYST BP LT 130 MM HG: CPT | Performed by: FAMILY MEDICINE

## 2024-07-03 PROCEDURE — 85652 RBC SED RATE AUTOMATED: CPT | Performed by: FAMILY MEDICINE

## 2024-07-03 PROCEDURE — 3078F DIAST BP <80 MM HG: CPT | Performed by: FAMILY MEDICINE

## 2024-07-03 PROCEDURE — 99214 OFFICE O/P EST MOD 30 MIN: CPT | Performed by: FAMILY MEDICINE

## 2024-07-03 PROCEDURE — 82550 ASSAY OF CK (CPK): CPT | Performed by: FAMILY MEDICINE

## 2024-07-03 RX ORDER — PREDNISONE 20 MG/1
TABLET ORAL
Qty: 18 TABLET | Refills: 0 | Status: SHIPPED | OUTPATIENT
Start: 2024-07-03 | End: 2024-07-12

## 2024-07-03 NOTE — PROGRESS NOTES
Cynthia Swanson is a 39 year old male.    CC:    Chief Complaint   Patient presents with    Arm Pain     Both forearms.        HPI:  B forearm pain dorsally for a few weeks. No recall of trauma. No new repetitive tasks. Left over Mobic not helpful. Chiropractic care of no help. Pain worse when mobilizing the wrists.   Allergies:  No Known Allergies   Current Meds:  No current outpatient medications on file.        History:  No past medical history on file.   Past Surgical History:   Procedure Laterality Date    Knee arthroscopy Right     Other surgical history  2010    R ACL repair      No family history on file.   Family Status   Relation Status    Mo     Fa       Social History     Socioeconomic History    Marital status:    Tobacco Use    Smoking status: Never    Smokeless tobacco: Never   Substance and Sexual Activity    Alcohol use: No    Drug use: No     Social Determinants of Health      [facility]    Social Connections    [facility] from [facility]    Housing Stability        ROS:  General: energy level stable, no fevers  Skin/Breast: Denies: rash  GI: Denies hematochezia    Vitals: /76   Pulse 85   Temp 99 °F (37.2 °C) (Temporal)   Wt 220 lb (99.8 kg)   SpO2 96%   BMI 28.25 kg/m²    Reviewed by LORAINE Rizvi M.D.    Physical Exam:  GEN: well developed, well nourished, in no apparent distress  EYE: B conjunctiva and lids normal  HENT: ---  NECK: No lymphadenopathy, thyromegaly or masses  CAR: S1, S2 normal, RRR; no S3, no S4; no click; murmur negative  PULM: clear to auscultation B, no accessory muscle use  GI: not examined  PSYCH: alert and oriented x 3; affect appropriate  SKIN: not examined  EXTREMITIES: No clubbing, cyanosis or edema  GENITAL: not examined  LYMPH: no supraclavicular nodes  NEURO: Awake and alert. Normal speech and articulation. No  facial droop or asymmetry. Moving all extremities equally.  MS: B elbows and wrists with full ROM. Opposed wrist flexion/extension/supination/rotation does not reproduce the pain. Palpation of the belly of the extensor complex in both forearms reproduces the pain    ASSESSMENT AND PLAN    1. Pain in both forearms  Take prescribed medications as directed.   Await results     - CK (Creatine Kinase) (Not Creatinine) (E); Future  - Sed Rate, Westergren (Automated); Future  - VENIPUNCTURE      No follow-ups on file.    Orders for this visit:    Orders Placed This Encounter   Procedures    CK (Creatine Kinase) (Not Creatinine) (E)     Standing Status:   Future     Standing Expiration Date:   7/3/2025     Order Specific Question:   Release to patient     Answer:   Immediate    Sed Rate, Estevanergren (Automated)     Standing Status:   Future     Standing Expiration Date:   7/3/2025    VENIPUNCTURE     Order Specific Question:   Release to patient     Answer:   Immediate       None    Meds & Refills for this Visit:  Requested Prescriptions     Signed Prescriptions Disp Refills    predniSONE 20 MG Oral Tab 18 tablet 0     Sig: 3 qd x 3 days, then 2 qd x 3 days, then 1 qd x 3 days with food.             Authorized by Killian Rizvi M.D.

## 2024-07-15 ENCOUNTER — PATIENT MESSAGE (OUTPATIENT)
Dept: FAMILY MEDICINE CLINIC | Facility: CLINIC | Age: 39
End: 2024-07-15

## 2024-07-15 NOTE — TELEPHONE ENCOUNTER
From: Cynthia Swanson  To: Killian Rizvi  Sent: 7/15/2024 10:45 AM CDT  Subject: Forearm    I am finished with the Prednisone and it is better but still there. I'm afraid if I overuse it it will just come back. What else can I do?    Thank You

## 2024-08-02 ENCOUNTER — PATIENT MESSAGE (OUTPATIENT)
Dept: FAMILY MEDICINE CLINIC | Facility: CLINIC | Age: 39
End: 2024-08-02

## 2024-08-02 DIAGNOSIS — M25.521 RIGHT ELBOW PAIN: Primary | ICD-10-CM

## 2024-08-03 NOTE — TELEPHONE ENCOUNTER
From: Cynthia Swanson  To: Killian Rizvi  Sent: 8/2/2024 7:39 PM CDT  Subject: Forearm     It's been about 2 weeks on the Meloxicam and Left forearm is better but still lingering a bit. The right is still on and off flaring up sometimes so bad I can  certains things at all.

## 2024-08-07 ENCOUNTER — TELEPHONE (OUTPATIENT)
Dept: ORTHOPEDICS CLINIC | Facility: CLINIC | Age: 39
End: 2024-08-07

## 2024-08-07 DIAGNOSIS — M79.631 RIGHT FOREARM PAIN: Primary | ICD-10-CM

## 2024-08-07 NOTE — TELEPHONE ENCOUNTER
Patient is scheduled for right forearm pain. Please advise if imaging is needed.  Future Appointments   Date Time Provider Department Center   8/21/2024  3:00 PM Pedro Pablo Rapp,  [facility] [address]

## 2024-08-10 NOTE — TELEPHONE ENCOUNTER
Order placed for rt forearm XR WB  Scheduled XR appt.  Advised patient to arrive 30 min prior to OV to complete XR via [employer] message  Future Appointments   Date Time Provider Department Center   8/21/2024  2:45 PM PF XR [facility] 1 [facility] X[facility] [place]   8/21/2024  3:00 PM Pedro Pablo Rapp,  [facility] [address]

## 2024-08-15 ENCOUNTER — PATIENT MESSAGE (OUTPATIENT)
Dept: FAMILY MEDICINE CLINIC | Facility: CLINIC | Age: 39
End: 2024-08-15

## 2024-08-15 NOTE — TELEPHONE ENCOUNTER
From: Cynthia Swanson  To: Killian Rizvi  Sent: 8/15/2024 6:47 PM CDT  Subject: Forearm     My appt with the Ortho got pushed back. Its still getting worse. Is there a way to get an X-ray and MRI ahead of time to speed things up?    Thank You

## 2024-08-16 ENCOUNTER — HOSPITAL ENCOUNTER (OUTPATIENT)
Dept: GENERAL RADIOLOGY | Age: 39
Discharge: HOME | End: 2024-08-16
Attending: FAMILY MEDICINE
Payer: COMMERCIAL

## 2024-08-16 DIAGNOSIS — M79.631 RIGHT FOREARM PAIN: ICD-10-CM

## 2024-08-16 PROCEDURE — 73090 X-RAY EXAM OF FOREARM: CPT | Performed by: FAMILY MEDICINE

## 2024-08-20 ENCOUNTER — PATIENT MESSAGE (OUTPATIENT)
Dept: FAMILY MEDICINE CLINIC | Facility: CLINIC | Age: 39
End: 2024-08-20

## 2024-08-20 DIAGNOSIS — M79.631 PAIN IN BOTH FOREARMS: ICD-10-CM

## 2024-08-20 DIAGNOSIS — M79.632 PAIN IN BOTH FOREARMS: ICD-10-CM

## 2024-08-20 DIAGNOSIS — Z82.0 FAMILY HISTORY OF AMYOTROPHIC LATERAL SCLEROSIS: Primary | ICD-10-CM

## 2024-08-20 NOTE — TELEPHONE ENCOUNTER
From: Cynthia Swanson  To: Killian Rizvi  Sent: 8/20/2024 10:35 AM CDT  Subject: Forearm    Sorry to keep bothering you with this. I'm just concerned there might be something else going on since it's in both arms. I'm scheduled with the Ortho for possibly an injection but I'm just not sure if there is an underlying issue. Part of my worry is that my dad passed away from ALS and I just hope it's not that. Is there anything else to do or another anti inflammatory med to try? The Meloxicam doesn't seem to do anything.

## 2024-08-30 ENCOUNTER — OFFICE VISIT (OUTPATIENT)
Facility: CLINIC | Age: 39
End: 2024-08-30
Payer: COMMERCIAL

## 2024-08-30 VITALS — BODY MASS INDEX: 28.23 KG/M2 | HEIGHT: 74 IN | WEIGHT: 220 LBS

## 2024-08-30 DIAGNOSIS — M77.11 RIGHT LATERAL EPICONDYLITIS: Primary | ICD-10-CM

## 2024-08-30 DIAGNOSIS — M65.9 TENOSYNOVITIS OF ELBOW: ICD-10-CM

## 2024-08-30 PROCEDURE — 20551 NJX 1 TENDON ORIGIN/INSJ: CPT | Performed by: FAMILY MEDICINE

## 2024-08-30 PROCEDURE — 76942 ECHO GUIDE FOR BIOPSY: CPT | Performed by: FAMILY MEDICINE

## 2024-08-30 PROCEDURE — 99214 OFFICE O/P EST MOD 30 MIN: CPT | Performed by: FAMILY MEDICINE

## 2024-08-30 PROCEDURE — 3008F BODY MASS INDEX DOCD: CPT | Performed by: FAMILY MEDICINE

## 2024-08-30 RX ORDER — TRIAMCINOLONE ACETONIDE 40 MG/ML
40 INJECTION, SUSPENSION INTRA-ARTICULAR; INTRAMUSCULAR ONCE
Status: COMPLETED | OUTPATIENT
Start: 2024-08-30 | End: 2024-08-30

## 2024-08-30 RX ORDER — KETOROLAC TROMETHAMINE 30 MG/ML
30 INJECTION, SOLUTION INTRAMUSCULAR; INTRAVENOUS ONCE
Status: COMPLETED | OUTPATIENT
Start: 2024-08-30 | End: 2024-08-30

## 2024-08-30 RX ADMIN — KETOROLAC TROMETHAMINE 30 MG: 30 INJECTION, SOLUTION INTRAMUSCULAR; INTRAVENOUS at 11:40:00

## 2024-08-30 RX ADMIN — TRIAMCINOLONE ACETONIDE 40 MG: 40 INJECTION, SUSPENSION INTRA-ARTICULAR; INTRAMUSCULAR at 11:40:00

## 2024-09-05 NOTE — H&P
Sports Medicine Clinic Note     Subjective:    Chief Complaint: Right lateral elbow pain.    History of Present Illness: This is a 39-year-old male presenting with persistent right lateral elbow pain for over a month, consistent with lateral epicondylitis. The patient reports working with a chiropractor and performing exercises without significant improvement. He has no known injury, and the pain has been gradually increasing, radiating proximally toward the elbow. No associated numbness, tingling, or other neurological symptoms. He is requesting an injection today for symptom relief.    Objective:    Right Elbow Examination:    Inspection:  No swelling, erythema, or obvious deformity of the right elbow.    Palpation:  Point tenderness over the lateral epicondyle.  No tenderness over the medial epicondyle, olecranon, or radial head.    Range of Motion:  Full range of motion in flexion, extension, pronation, and supination without limitation.    Neurovascular:  Sensation intact to light touch in radial, ulnar, and median nerve distributions.  2+ radial pulse, brisk capillary refill.    Special Tests:  Positive Cozen’s test, reproducing lateral elbow pain.  Positive Mill’s test.  Negative Varus and Valgus stress tests.    Diagnostic Tests:    Radiographs of the right forearm (2 views) were reviewed. No evidence of fracture, dislocation, deformity, or acute bony abnormalities. Soft tissues appear unremarkable.    Assessment:    Right lateral epicondylitis    Plan:    Injection: Ultrasound-guided corticosteroid injection administered to the lateral epicondyle for pain relief.    Activity Recommendations: Continue with gentle stretching and avoid activities that exacerbate symptoms.    Therapy: Recommend continued conservative management including physical therapy focused on eccentric strengthening exercises for the wrist extensors.    Additional imaging/workup: No further imaging indicated at this time.      Follow-up:  Patient is to return in 3-4 weeks for reassessment or sooner if symptoms worsen or do not improve.    Ultrasound Guided Procedure Note:    After discussion of the risks and benefits, the patient elected to proceed with a therapeutic injection into the right elbow common extensor tendon sheath under US guidance. Confirmed that the patient does not have history of prior adverse reactions, active infections, or relevant allergies. There was no erythema or warmth, and the skin was clear.    The skin was sterilized with ChloraPrep. A 22 gauge needle was inserted via inferolateral approach utilizing US for needle guidance and placement. The site was injected with a mixture of 1 mL of kenalog 40 mg/mL, 1 mL of Toradol 30 mg/mL and 1 mL of 1% Lidocaine without Epinephrine. The injection was completed without complication, and a bandage was applied.    The patient tolerated the procedure well and was instructed to avoid strenuous activity for the next 24-48 hours and to use ice or Tylenol for pain as needed. The patient will call immediately with any signs of infection or allergic reaction.    Post-Injection Care: The patient tolerated the procedure well. An occlusive bandage was placed over the injection site. Post-injection care instructions provided to the patient. The patient was asked to avoid strenuous activity and continue to rest the area for 2-3 days before resuming regular activities. Patient advised that the area may be more painful for the first 1-2 days. They can use ice or Tylenol for pain as needed.  Patient was instructed to watch for fever, increased swelling, or persistent pain. The patient will call immediately with any signs of infection or allergic reaction.    Complications: The patient tolerated the procedure well without any complications.      Pedro Pablo Rapp DO, CAM   Primary Care Sports Medicine    Department of Orthopaedic Surgery  [facility]    [address] [address]  [place] [address]  [address] [place] [id number]    t: [phone]  f: [phone]      [email or web address]

## 2024-09-07 ENCOUNTER — PATIENT MESSAGE (OUTPATIENT)
Facility: CLINIC | Age: 39
End: 2024-09-07

## 2024-09-09 NOTE — TELEPHONE ENCOUNTER
From: Cynthia Swanson  To: Pedro Pablo GARDINER  Sent: 9/7/2024 7:18 PM CDT  Subject: Left arm    So my right arm is getting better. The shot seems to be helping. As for my left arm it is still up and down but shouldn't need a shot. Is there something better or different that you can prescribe I can try? If that doesn't work I will try therapy but I have 4 kids and zero time so I would to try something else first.     Thank You

## 2024-12-16 ENCOUNTER — PATIENT MESSAGE (OUTPATIENT)
Dept: FAMILY MEDICINE CLINIC | Facility: CLINIC | Age: 39
End: 2024-12-16

## 2024-12-16 DIAGNOSIS — M79.601 PAIN IN BOTH UPPER EXTREMITIES: Primary | ICD-10-CM

## 2024-12-16 DIAGNOSIS — M79.602 PAIN IN BOTH UPPER EXTREMITIES: Primary | ICD-10-CM

## 2025-01-09 ENCOUNTER — PATIENT MESSAGE (OUTPATIENT)
Dept: FAMILY MEDICINE CLINIC | Facility: CLINIC | Age: 40
End: 2025-01-09

## 2025-01-09 DIAGNOSIS — M79.601 PAIN IN BOTH UPPER EXTREMITIES: Primary | ICD-10-CM

## 2025-01-09 DIAGNOSIS — M79.602 PAIN IN BOTH UPPER EXTREMITIES: Primary | ICD-10-CM

## 2025-01-09 NOTE — TELEPHONE ENCOUNTER
My Chart message from patient:   Can you recommend another orthopedic in network? I can't take any time off work sena current Ortho only has appt until 3pm.     Thank You

## 2025-01-30 ENCOUNTER — OFFICE VISIT (OUTPATIENT)
Dept: FAMILY MEDICINE CLINIC | Facility: CLINIC | Age: 40
End: 2025-01-30
Payer: COMMERCIAL

## 2025-01-30 VITALS
HEIGHT: 74 IN | DIASTOLIC BLOOD PRESSURE: 84 MMHG | TEMPERATURE: 100 F | WEIGHT: 214.19 LBS | BODY MASS INDEX: 27.49 KG/M2 | SYSTOLIC BLOOD PRESSURE: 130 MMHG | OXYGEN SATURATION: 97 % | HEART RATE: 107 BPM

## 2025-01-30 DIAGNOSIS — Z00.00 WELL ADULT EXAM: Primary | ICD-10-CM

## 2025-01-30 DIAGNOSIS — G47.00 INSOMNIA, UNSPECIFIED TYPE: ICD-10-CM

## 2025-01-30 LAB
ALT SERPL-CCNC: 30 U/L
ANION GAP SERPL CALC-SCNC: 11 MMOL/L (ref 0–18)
AST SERPL-CCNC: 33 U/L (ref ?–34)
BUN BLD-MCNC: 14 MG/DL (ref 9–23)
CALCIUM BLD-MCNC: 9.8 MG/DL (ref 8.7–10.6)
CHLORIDE SERPL-SCNC: 102 MMOL/L (ref 98–112)
CHOLEST SERPL-MCNC: 188 MG/DL (ref ?–200)
CO2 SERPL-SCNC: 26 MMOL/L (ref 21–32)
CREAT BLD-MCNC: 1.16 MG/DL
EGFRCR SERPLBLD CKD-EPI 2021: 82 ML/MIN/1.73M2 (ref 60–?)
ERYTHROCYTE [DISTWIDTH] IN BLOOD BY AUTOMATED COUNT: 11.9 %
FASTING PATIENT LIPID ANSWER: NO
FASTING STATUS PATIENT QL REPORTED: NO
GLUCOSE BLD-MCNC: 100 MG/DL (ref 70–99)
HCT VFR BLD AUTO: 46.4 %
HDLC SERPL-MCNC: 58 MG/DL (ref 40–59)
HGB BLD-MCNC: 16.1 G/DL
LDLC SERPL CALC-MCNC: 118 MG/DL (ref ?–100)
MCH RBC QN AUTO: 30.6 PG (ref 26–34)
MCHC RBC AUTO-ENTMCNC: 34.7 G/DL (ref 31–37)
MCV RBC AUTO: 88.2 FL
NONHDLC SERPL-MCNC: 130 MG/DL (ref ?–130)
OSMOLALITY SERPL CALC.SUM OF ELEC: 289 MOSM/KG (ref 275–295)
PLATELET # BLD AUTO: 328 10(3)UL (ref 150–450)
POTASSIUM SERPL-SCNC: 4.1 MMOL/L (ref 3.5–5.1)
RBC # BLD AUTO: 5.26 X10(6)UL
SODIUM SERPL-SCNC: 139 MMOL/L (ref 136–145)
TRIGL SERPL-MCNC: 66 MG/DL (ref 30–149)
TSI SER-ACNC: 1.12 UIU/ML (ref 0.55–4.78)
VLDLC SERPL CALC-MCNC: 11 MG/DL (ref 0–30)
WBC # BLD AUTO: 7.8 X10(3) UL (ref 4–11)

## 2025-01-30 PROCEDURE — 84460 ALANINE AMINO (ALT) (SGPT): CPT | Performed by: FAMILY MEDICINE

## 2025-01-30 PROCEDURE — 80048 BASIC METABOLIC PNL TOTAL CA: CPT | Performed by: FAMILY MEDICINE

## 2025-01-30 PROCEDURE — 85027 COMPLETE CBC AUTOMATED: CPT | Performed by: FAMILY MEDICINE

## 2025-01-30 PROCEDURE — 84443 ASSAY THYROID STIM HORMONE: CPT | Performed by: FAMILY MEDICINE

## 2025-01-30 PROCEDURE — 84450 TRANSFERASE (AST) (SGOT): CPT | Performed by: FAMILY MEDICINE

## 2025-01-30 PROCEDURE — 80061 LIPID PANEL: CPT | Performed by: FAMILY MEDICINE

## 2025-01-30 RX ORDER — ZOLPIDEM TARTRATE 5 MG/1
5 TABLET ORAL NIGHTLY PRN
Qty: 30 TABLET | Refills: 0 | Status: SHIPPED | OUTPATIENT
Start: 2025-01-30

## 2025-02-05 ENCOUNTER — PATIENT MESSAGE (OUTPATIENT)
Dept: FAMILY MEDICINE CLINIC | Facility: CLINIC | Age: 40
End: 2025-02-05

## 2025-02-05 ENCOUNTER — TELEMEDICINE (OUTPATIENT)
Dept: FAMILY MEDICINE CLINIC | Facility: CLINIC | Age: 40
End: 2025-02-05
Payer: COMMERCIAL

## 2025-02-05 DIAGNOSIS — F32.A DEPRESSION, UNSPECIFIED DEPRESSION TYPE: Primary | ICD-10-CM

## 2025-02-05 DIAGNOSIS — F52.4 PREMATURE EJACULATION: ICD-10-CM

## 2025-02-05 PROCEDURE — 98006 SYNCH AUDIO-VIDEO EST MOD 30: CPT | Performed by: FAMILY MEDICINE

## 2025-02-05 RX ORDER — SERTRALINE HYDROCHLORIDE 25 MG/1
25 TABLET, FILM COATED ORAL NIGHTLY
Qty: 30 TABLET | Refills: 0 | Status: SHIPPED | OUTPATIENT
Start: 2025-02-05

## 2025-02-05 NOTE — PROGRESS NOTES
My Chart/ Video/Telephone Visit Check-In    Cynthia Swanson and/or caregiver verbally consents a video Check-In service on 25.  Patient understands and accepts financial responsibility for any deductible, co-insurance and/or co-pays associated with this service.    Duration of the service including reviewing the chart, engaging with the patient and giving medical guidance: 12 minutes    Cynthia Swanson is a 39 year old male.    CC:  depression    HPI:  Going through marital strife for at least 6 months. His mood is lower. Motivation is lower. He is not sleeping well. No HI/SI. Also notes premature ejaculation issues. Ambien has helped his sleep.    Allergies as of 2025    (No Known Allergies)        Current Meds:  Current Outpatient Medications   Medication Sig Dispense Refill    zolpidem 5 MG Oral Tab Take 1 tablet (5 mg total) by mouth nightly as needed for Sleep. 30 tablet 0        History:  No past medical history on file.   Past Surgical History:   Procedure Laterality Date    Knee arthroscopy Right     Other surgical history      R ACL repair      No family history on file.   Family Status   Relation Status    Mo     Fa       Social History     Socioeconomic History    Marital status:    Tobacco Use    Smoking status: Never    Smokeless tobacco: Never   Substance and Sexual Activity    Alcohol use: No    Drug use: No     Social Drivers of Health      [facility]    Housing Stability            Physical Exam:  General: No apparent distress when conversing with me  Psych: Alert and oriented x 3, speech was not pressured  Resp: No respiratory distress noted when conversing with me, able to speak in full sentences.     ASSESSMENT AND PLAN    1. Depression, unspecified depression type  Take prescribed medications as directed.   - sertraline (ZOLOFT) 25 MG Oral Tab; Take 1 tablet (25 mg total) by  mouth at bedtime.  Dispense: 30 tablet; Refill: 0  F/u in 4 weeks   He will be starting couples counseling with his wife.    2. Premature ejaculation  Zoloft should help this      No follow-ups on file.    Orders for this visit:    No orders of the defined types were placed in this encounter.      None    Meds & Refills for this Visit:  Requested Prescriptions     Signed Prescriptions Disp Refills    sertraline (ZOLOFT) 25 MG Oral Tab 30 tablet 0     Sig: Take 1 tablet (25 mg total) by mouth at bedtime.             Authorized by Killian Rizvi M.D.      Please note that the following visit was completed using two-way, real-time interactive audio and/or video communication.  This has been done in good jarrod to provide continuity of care in the best interest of the provider-patient relationship, due to the ongoing public health crisis/national emergency and because of restrictions of visitation.  There are limitations of this visit as no physical exam could be performed.  Every conscious effort was taken to allow for sufficient and adequate time.  This billing was spent on reviewing labs, medications, radiology tests and decision making.  Appropriate medical decision-making and tests are ordered as detailed in the plan of care above.”

## 2025-02-14 ENCOUNTER — PATIENT MESSAGE (OUTPATIENT)
Dept: FAMILY MEDICINE CLINIC | Facility: CLINIC | Age: 40
End: 2025-02-14

## 2025-02-14 RX ORDER — HYDROXYZINE PAMOATE 25 MG/1
25 CAPSULE ORAL 2 TIMES DAILY PRN
Qty: 60 CAPSULE | Refills: 0 | Status: SHIPPED | OUTPATIENT
Start: 2025-02-14

## 2025-03-14 DIAGNOSIS — F32.A DEPRESSION, UNSPECIFIED DEPRESSION TYPE: ICD-10-CM

## 2025-03-16 RX ORDER — SERTRALINE HYDROCHLORIDE 25 MG/1
25 TABLET, FILM COATED ORAL NIGHTLY
Qty: 90 TABLET | Refills: 0 | Status: SHIPPED | OUTPATIENT
Start: 2025-03-16

## 2025-03-18 DIAGNOSIS — G47.00 INSOMNIA, UNSPECIFIED TYPE: ICD-10-CM

## 2025-03-18 RX ORDER — HYDROXYZINE PAMOATE 25 MG/1
25 CAPSULE ORAL 2 TIMES DAILY PRN
Qty: 60 CAPSULE | Refills: 0 | Status: SHIPPED | OUTPATIENT
Start: 2025-03-18

## 2025-03-18 RX ORDER — ZOLPIDEM TARTRATE 5 MG/1
5 TABLET ORAL NIGHTLY PRN
Qty: 30 TABLET | Refills: 0 | Status: SHIPPED | OUTPATIENT
Start: 2025-03-18

## 2025-05-12 DIAGNOSIS — G47.00 INSOMNIA, UNSPECIFIED TYPE: ICD-10-CM

## 2025-05-12 RX ORDER — ZOLPIDEM TARTRATE 5 MG/1
5 TABLET ORAL NIGHTLY PRN
Qty: 30 TABLET | Refills: 0 | Status: SHIPPED | OUTPATIENT
Start: 2025-05-12

## 2025-05-12 RX ORDER — HYDROXYZINE PAMOATE 25 MG/1
25 CAPSULE ORAL 2 TIMES DAILY PRN
Qty: 60 CAPSULE | Refills: 0 | Status: SHIPPED | OUTPATIENT
Start: 2025-05-12

## 2025-07-04 DIAGNOSIS — G47.00 INSOMNIA, UNSPECIFIED TYPE: ICD-10-CM

## 2025-07-09 RX ORDER — HYDROXYZINE PAMOATE 25 MG/1
25 CAPSULE ORAL 2 TIMES DAILY PRN
Qty: 60 CAPSULE | Refills: 1 | Status: SHIPPED | OUTPATIENT
Start: 2025-07-09

## 2025-07-09 RX ORDER — ZOLPIDEM TARTRATE 5 MG/1
5 TABLET ORAL NIGHTLY PRN
Qty: 30 TABLET | Refills: 0 | Status: SHIPPED | OUTPATIENT
Start: 2025-07-09

## 2025-07-17 ENCOUNTER — TELEMEDICINE (OUTPATIENT)
Dept: FAMILY MEDICINE CLINIC | Facility: CLINIC | Age: 40
End: 2025-07-17
Payer: COMMERCIAL

## 2025-07-17 DIAGNOSIS — K92.1 HEMATOCHEZIA: Primary | ICD-10-CM

## 2025-07-17 PROCEDURE — 98005 SYNCH AUDIO-VIDEO EST LOW 20: CPT | Performed by: FAMILY MEDICINE

## 2025-07-17 NOTE — PROGRESS NOTES
My Chart/ Video/Telephone Visit Check-In    Cynthia Swanson and/or caregiver verbally consents a video Check-In service on 25. [employer].  Patient understands and accepts financial responsibility for any deductible, co-insurance and/or co-pays associated with this service.    Duration of the service including reviewing the chart, engaging with the patient and giving medical guidance: 10 minutes    Cynthia Swanson is a 40 year old male.    CC:  Blood in stool    HPI:  Noting blood in the toilet water with BM the past 2 weeks. No pain with BM. No abdominal cramps. No weight loss. Energy is very good. He recalls having a hemorrhoid in the past that he could feel externally. This is not present currently.     Allergies as of 2025    (No Known Allergies)        Current Meds:  Current Outpatient Medications   Medication Sig Dispense Refill    zolpidem 5 MG Oral Tab Take 1 tablet (5 mg total) by mouth nightly as needed for Sleep. 30 tablet 0    hydrOXYzine Pamoate 25 MG Oral Cap Take 1 capsule (25 mg total) by mouth 2 (two) times daily as needed for Anxiety. 60 capsule 1        History:  No past medical history on file.   Past Surgical History:   Procedure Laterality Date    Knee arthroscopy Right     Other surgical history      R ACL repair      No family history on file.   Family Status   Relation Status    Mo     Fa       Social History     Socioeconomic History    Marital status:    Tobacco Use    Smoking status: Never    Smokeless tobacco: Never   Substance and Sexual Activity    Alcohol use: No    Drug use: No     Social Drivers of Health      Received from [facility]    Housing Stability        ROS:  General: energy level stable       Physical Exam:  General: No apparent distress when conversing with me  Psych: Alert and oriented x 3, speech was not pressured  Resp: No respiratory distress noted when conversing with me, able to speak in full  sentences.     ASSESSMENT AND PLAN    1. Hematochezia  May be an irritated internal hemorrhoid.  He will use Psyllium whole husk as directed   He is to drink 5-6 glasses of water per day    - Surgery Referral - In Network for possible anoscopy vs other endoscopic procedure to further evaluate and ensure no other process occurring such as a polyp or early cancer.       No follow-ups on file.    Orders for this visit:    No orders of the defined types were placed in this encounter.      SURGERY - INTERNAL    Meds & Refills for this Visit:  Requested Prescriptions      No prescriptions requested or ordered in this encounter             Authorized by Killian Rizvi M.D.      Please note that the following visit was completed using two-way, real-time interactive audio and/or video communication.  This has been done in good jarrod to provide continuity of care in the best interest of the provider-patient relationship, due to the ongoing public health crisis/national emergency and because of restrictions of visitation.  There are limitations of this visit as no physical exam could be performed.  Every conscious effort was taken to allow for sufficient and adequate time.  This billing was spent on reviewing labs, medications, radiology tests and decision making.  Appropriate medical decision-making and tests are ordered as detailed in the plan of care above.”

## 2025-08-10 DIAGNOSIS — G47.00 INSOMNIA, UNSPECIFIED TYPE: ICD-10-CM

## 2025-08-11 ENCOUNTER — PATIENT MESSAGE (OUTPATIENT)
Facility: CLINIC | Age: 40
End: 2025-08-11

## 2025-08-11 RX ORDER — ZOLPIDEM TARTRATE 5 MG/1
5 TABLET ORAL NIGHTLY PRN
Qty: 30 TABLET | Refills: 0 | Status: SHIPPED | OUTPATIENT
Start: 2025-08-11

## 2025-08-12 ENCOUNTER — PATIENT MESSAGE (OUTPATIENT)
Dept: FAMILY MEDICINE CLINIC | Facility: CLINIC | Age: 40
End: 2025-08-12

## 2025-08-22 ENCOUNTER — OFFICE VISIT (OUTPATIENT)
Facility: CLINIC | Age: 40
End: 2025-08-22

## 2025-08-22 DIAGNOSIS — T84.89XS TEAR OF ANTERIOR CRUCIATE LIGAMENT GRAFT, SEQUELA: Primary | ICD-10-CM

## 2025-08-22 DIAGNOSIS — M23.611 CHRONIC RUPTURE OF ACL OF RIGHT KNEE: ICD-10-CM

## 2025-08-22 PROCEDURE — 99214 OFFICE O/P EST MOD 30 MIN: CPT | Performed by: FAMILY MEDICINE

## (undated) NOTE — LETTER
Patient Name: Cynthia Swanson        : 3/21/1985       Medical Record #: [id number]    CONSENT FOR PROCEDURES/SEDATION    Date: 2024       Time: 11:40 AM        1. I authorize the performance upon Cynthia Swanson the following:    RIGHT ELBOW   __________________________________________________________________________    2. I authorize Dr. CUBA (and whomever is designated as the doctor’s assistant), to perform the above mentioned procedures.    3. If any unforeseen conditions arise during this procedure calling for additional procedures, operations, or medications (including anesthesia and blood transfusion), I  further request and authorize the doctor to do whatever he/she deems advisable in my interest.    4. I consent to the taking and reproduction of any photographs in the course of this procedure for professional purposes.    5. I consent to the administration of such sedation as may be considered necessary or advisable by the physician responsible for this service, with the exception of  _____________________________.    6. I have been informed by my doctor of the nature and purpose of this procedure/sedation, possible alternative methods of treatment, risk involved and possible complications.      Signature of Patient:___________________________  DATE: 24    Signature of person authorized to consent for patient: Relationship to patient:  ___________________________    ___________________    Witness: ____________________     DATE: 24    Provider: ____________________     DATE: 24